# Patient Record
Sex: FEMALE | Race: WHITE | NOT HISPANIC OR LATINO | Employment: FULL TIME | URBAN - METROPOLITAN AREA
[De-identification: names, ages, dates, MRNs, and addresses within clinical notes are randomized per-mention and may not be internally consistent; named-entity substitution may affect disease eponyms.]

---

## 2019-02-01 ENCOUNTER — HOSPITAL ENCOUNTER (EMERGENCY)
Facility: CLINIC | Age: 29
Discharge: HOME | End: 2019-02-01
Admitting: PHYSICIAN ASSISTANT
Payer: COMMERCIAL

## 2019-02-01 VITALS
TEMPERATURE: 98.2 F | SYSTOLIC BLOOD PRESSURE: 122 MMHG | DIASTOLIC BLOOD PRESSURE: 83 MMHG | HEART RATE: 101 BPM | RESPIRATION RATE: 18 BRPM | OXYGEN SATURATION: 98 %

## 2019-02-01 DIAGNOSIS — K08.89 PAIN, DENTAL: ICD-10-CM

## 2019-02-01 PROCEDURE — 99282 EMERGENCY DEPT VISIT SF MDM: CPT

## 2019-02-01 RX ORDER — AMOXICILLIN 500 MG/1
500 CAPSULE ORAL 3 TIMES DAILY
Qty: 21 CAPSULE | Refills: 0 | Status: SHIPPED | OUTPATIENT
Start: 2019-02-01 | End: 2019-02-08

## 2019-02-01 ASSESSMENT — ENCOUNTER SYMPTOMS
FEVER: 0
SHORTNESS OF BREATH: 0
DIARRHEA: 0
VOMITING: 0
CHILLS: 0
NAUSEA: 0
TROUBLE SWALLOWING: 0
NUMBNESS: 0
WEAKNESS: 0

## 2019-02-01 NOTE — DISCHARGE INSTRUCTIONS
Based on the symptoms you presented with today, it is recommended that you undergo a CT scan of the face to evaluate for a deep space infection.  You have elected against this, with the understanding that we cannot rule out the possibility of a more serious infection of the jaw or face which could potentially be life threatening.  You may return to the emergency department at any time if you change your mind, or if you have any new or worsening symptoms.  Discharge Instructions  Dental Pain    You have been seen today for a toothache. Your pain may be caused by an exposed nerve, an infection (pulpitis), a root abscess (pocket of pus), or other problems. You will need to see a dentist for a solution to your tooth problem. Emergency Department care is only to help control your problem until you can see a dentist; we cannot provide complete dental care.  Today, we did not find any sign that your toothache was caused by any dangerous or life-threatening condition, but sometimes symptoms develop over time and cannot be found during an emergency visit, so it is very important that you follow up with your dentist.      Generally, every Emergency Department visit should have a follow-up clinic visit with either a primary or a specialty clinic/provider. Please follow-up as instructed by your emergency provider today.    Return to the Emergency Department if:  You develop a new fever over 100.4 F.  You cannot open your mouth normally, cannot move your tongue well, or cannot swallow.  You have new or increased swelling of your face or neck.  You develop drainage of pus or foul smelling material from around your tooth.  What can I do to help myself?  Take any antibiotic the provider may have prescribed for you today.  Avoid very hot or very cold foods as both can cause pain.  Make an appointment to see a dentist as soon as possible. Dentists are generally not ?on-staff? at hospitals so we cannot ?refer? to you to dentist but we  may be able to provide a list of dental clinics to help you.  If you were given a prescription for medicine here today, be sure to read all of the information (including the package insert) that comes with your prescription.  This will include important information about the medicine, its side effects, and any warnings that you need to know about.  The pharmacist who fills the prescription can provide more information and answer questions you may have about the medicine.  If you have questions or concerns that the pharmacist cannot address, please call or return to the Emergency Department.   Remember that you can always come back to the Emergency Department if you are not able to see your regular provider in the amount of time listed above, if you get any new symptoms, or if there is anything that worries you.

## 2019-02-01 NOTE — ED PROVIDER NOTES
History     Chief Complaint:  Dental Pain      HPI   Althea Christy is a 28 year old female who presents with dental pain. The patient states she has had previous issues with her bottom right wisdom tooth. She has been on antibiotics before, which has helped in the short term but issues have been recurrent 3-4 times over the last year. Three days ago she began having difficulty opening her mouth and when she tries to she states she experiences sharp shooting pain up her jaw. The patient notes that she has an appointment with a surgeon on February 7th for her wisdom tooth extraction. Her most recent episode of pain was one month ago, at which time she was placed on an antibiotic which she believes was Clindamycin which resolved her symptoms at that time. The patient denies fever, chills, shortness of breath, trouble swallowing, nausea, vomiting, diarrhea, numbness, or mouth sores. She has been using ibuprofen for pain relief at home, which she feels is sufficient.    Allergies:  NKDA    Medications:    The patient is currently on no regular medications.    Past Medical History:    History reviewed.  No significant past medical history.     Past Surgical History:    History reviewed. No pertinent past surgical history.    Family History:    History reviewed. No pertinent family history.    Social History:  Marital Status:  Single [1]  Negative for tobacco use.  Negative for alcohol use.     Review of Systems   Constitutional: Negative for chills and fever.   HENT: Positive for dental problem. Negative for mouth sores and trouble swallowing.    Respiratory: Negative for shortness of breath.    Gastrointestinal: Negative for diarrhea, nausea and vomiting.   Neurological: Negative for weakness and numbness.   All other systems reviewed and are negative.    Physical Exam     Patient Vitals for the past 24 hrs:   BP Temp Temp src Pulse Resp SpO2   02/01/19 1346 122/83 98.2  F (36.8  C) Oral 101 18 98 %         Physical  Exam  General: Alert and cooperative with exam. Resting comfortably on gurney  Head:  Scalp is NC/AT  Eyes:  No scleral icterus, PERRL,     EOMI   ENT:  The external nose and ears are normal.     There is some mild swelling of the right buccal mucosa.  There is no superficial area of fluctuance amenable to incision and drainage. Right lower 3rd molar is partially protruding through floor of mouth but has not fully erupted.  The patient has trismus on exam and is not able to fully open her mouth. Uvula midline, no submandibular swelling, sublingual swelling.  Neck:  Normal range of motion without rigidity.  CV:  Regular rate and rhythm    No pathologic murmur, rubs, or gallops.  Resp:  Breath sounds are clear bilaterally.  No crackles, wheezes, rhonchi.    Non-labored, no retractions or accessory muscle use  Skin:  Warm and dry, No rash or lesions noted.  Neuro: Oriented x 3. No gross motor deficits.    CN II-XII grossly intact.  Psych: Awake. Alert. Normal affect. Appropriate interactions.  Lymph: No anterior or posterior cervical lymphadenopathy noted.    Emergency Department Course   Emergency Department Course:  Nursing notes and vitals reviewed. (1401) I performed an exam of the patient as documented above.     (1420) I rechecked the patient and discussed the results of her workup thus far.     Findings and plan explained to the Patient. Patient discharged home with instructions regarding supportive care, medications, and reasons to return. The importance of close follow-up was reviewed.     I personally reviewed the laboratory results with the Patient and answered all related questions prior to discharge.      Impression & Plan    Medical Decision Makin-year-old female who presents with dental pain.  Patient history and records reviewed.  On examination, the patient is afebrile, mildly tachycardic but otherwise normal vitals.  Examination reveals trismus and some mild swelling of the right buccal mucosa  with tenderness to palpation over the right lower wisdom tooth.  There are no signs of Blake's angina, peritonsillar abscess, or abscess amenable to incision and drainage on exam.  Given the patient's obvious trismus on exam, I am concerned about the possibility of deep space infection and I recommended that she undergo CT scan to rule this out.  The patient declined, and states that she has had this symptom before in the past and her symptoms have improved following antibiotics.  She additionally states that she had x-rays 1 month ago when she had symptoms which did not show this.  I explained to the patient that this does not rule out the possibility that a new deep space infection could have formed, and that without further workup the possibility of serious and potentially life-threatening infection cannot be ruled out.  The patient understands this, but elects to leave AGAINST MEDICAL ADVICE and states that she will follow-up with her oral and maxillofacial surgeon as scheduled on February 7.  I did prescribe oral antibiotics as below.  She was additionally offered a dental block in the emergency department but declined.  She has no signs of more systemic toxicity at this time.  I stressed that the patient is free to return to the emergency department at any time if she changes her mind, or if she has any new or worsening symptoms.  Patient was discharged home in stable condition.    Diagnosis:    ICD-10-CM    1. Pain, dental K08.89        Disposition:  AGAINST MEDICAL ADVICE    Discharge Medications:     Medication List      Started    amoxicillin 500 MG capsule  Commonly known as:  AMOXIL  500 mg, Oral, 3 TIMES DAILY          Scribe Disclosure:  I, Ning King, am serving as a scribe on 2/1/2019 at 2:01 PM to personally document services performed by Geovanny Cash PA-C. providers found based on my observations and the provider's statements to me.         Ning King  2/1/2019   [facility]  \[facility] [place] [facility]\"" EMERGENCY DEPARTMENT       Geovanny Cash PA-C  02/01/19 2769

## 2019-02-01 NOTE — ED TRIAGE NOTES
Bottom right sided wisdom tooth infected. States that she has had previous infections and has a consult next week. Pain 7/10, swelling noted and hard to open mouth.

## 2019-02-01 NOTE — ED AVS SNAPSHOT
[facility] Emergency Department  [address]  [facility] [address]  Phone:  [phone]  Fax:  [phone]                                    Althea Christy   MRN: [id number]    Department:  [facility] Emergency Department   Date of Visit:  2/1/2019           After Visit Summary Signature Page    I have received my discharge instructions, and my questions have been answered. I have discussed any challenges I see with this plan with the nurse or doctor.    ..........................................................................................................................................  Patient/Patient Representative Signature      ..........................................................................................................................................  Patient Representative Print Name and Relationship to Patient    ..................................................               ................................................  Date                                   Time    ..........................................................................................................................................  Reviewed by Signature/Title    ...................................................              ..............................................  Date                                               Time          22EPIC Rev 08/18

## 2020-08-25 ASSESSMENT — ANXIETY QUESTIONNAIRES
5. BEING SO RESTLESS THAT IT IS HARD TO SIT STILL: NOT AT ALL
2. NOT BEING ABLE TO STOP OR CONTROL WORRYING: MORE THAN HALF THE DAYS
7. FEELING AFRAID AS IF SOMETHING AWFUL MIGHT HAPPEN: NOT AT ALL
1. FEELING NERVOUS, ANXIOUS, OR ON EDGE: MORE THAN HALF THE DAYS
GAD7 TOTAL SCORE: 8
IF YOU CHECKED OFF ANY PROBLEMS ON THIS QUESTIONNAIRE, HOW DIFFICULT HAVE THESE PROBLEMS MADE IT FOR YOU TO DO YOUR WORK, TAKE CARE OF THINGS AT HOME, OR GET ALONG WITH OTHER PEOPLE: VERY DIFFICULT
6. BECOMING EASILY ANNOYED OR IRRITABLE: MORE THAN HALF THE DAYS
3. WORRYING TOO MUCH ABOUT DIFFERENT THINGS: MORE THAN HALF THE DAYS

## 2020-08-25 ASSESSMENT — PATIENT HEALTH QUESTIONNAIRE - PHQ9
5. POOR APPETITE OR OVEREATING: NOT AT ALL
SUM OF ALL RESPONSES TO PHQ QUESTIONS 1-9: 9

## 2020-08-25 NOTE — PROGRESS NOTES
"Althea Christy is a 29 year old female who is being evaluated via a billable telephone visit.      The patient has been notified of following:     \"This telephone visit will be conducted via a call between you and your physician/provider. We have found that certain health care needs can be provided without the need for a physical exam.  This service lets us provide the care you need with a short phone conversation.  If a prescription is necessary we can send it directly to your pharmacy.  If lab work is needed we can place an order for that and you can then stop by our lab to have the test done at a later time.    Telephone visits are billed at different rates depending on your insurance coverage. During this emergency period, for some insurers they may be billed the same as an in-person visit.  Please reach out to your insurance provider with any questions.    If during the course of the call the physician/provider feels a telephone visit is not appropriate, you will not be charged for this service.\"    Patient has given verbal consent for Telephone visit?  Yes    What phone number would you like to be contacted at? [phone]    How would you like to obtain your AVS? Mail a copy    Subjective     Althea Christy is a 29 year old female who presents via phone visit today for the following health issues:    HPI      Depression     How are you doing with your depression.     Are you having other symptoms that might be associated with depression? Yes:  sad, lack of motivation, fatigue    Have you had a significant life event?  Relationship Concerns- sons father     Are you feeling anxious or having panic attacks?   Yes:  feels anxious alot     Do you have any concerns with your use of alcohol or other drugs? No- has history of alcohol abuse. Currently in outpatient treatment.  People think she might have Bipolar disorder- want to address this and depression.   Was on zoloft years ago and made her do nothging but lay on " couch hated it   Impulsive gambling addiction  Outpatient treatment [facility]   3 months program   Group 4 hrs Sat and Sun  Saw therapist prior to lat month, wants to avoid meds until has diagnosis   Eats well, sleeps as well as she can with small kids at home   3 children 2 and 3 year olds     Social History     Tobacco Use     Smoking status: Not on file   Substance Use Topics     Alcohol use: Not on file     Drug use: Not on file     No flowsheet data found.  No flowsheet data found.  Last PHQ-9 8/25/2020   1.  Little interest or pleasure in doing things 2   2.  Feeling down, depressed, or hopeless 2   3.  Trouble falling or staying asleep, or sleeping too much 0   4.  Feeling tired or having little energy 2   5.  Poor appetite or overeating 0   6.  Feeling bad about yourself 2   7.  Trouble concentrating 1   8.  Moving slowly or restless 0   Q9: Thoughts of better off dead/self-harm past 2 weeks 0   PHQ-9 Total Score 9   Difficulty at work, home, or with people Very difficult         Suicide Assessment Five-step Evaluation and Treatment (SAFE-T)      How many servings of fruits and vegetables do you eat daily?  2-3    On average, how many sweetened beverages do you drink each day (Examples: soda, juice, sweet tea, etc.  Do NOT count diet or artificially sweetened beverages)?   2    How many days per week do you exercise enough to make your heart beat faster? 3 or less    How many minutes a day do you exercise enough to make your heart beat faster? 9 or less    How many days per week do you miss taking your medication? 0         Review of Systems   Constitutional, HEENT, cardiovascular, pulmonary, GI, , musculoskeletal, neuro, skin, endocrine and psych systems are negative, except as otherwise noted.       Objective          Vitals:  No vitals were obtained today due to virtual visit.    healthy, alert and no distress  PSYCH: Alert and oriented times 3; coherent speech, normal   rate and  volume, able to articulate logical thoughts, able   to abstract reason, no tangential thoughts, no hallucinations   or delusions  Her affect is normal  RESP: No cough, no audible wheezing, able to talk in full sentences  Remainder of exam unable to be completed due to telephone visits            Assessment/Plan:    Assessment & Plan       ICD-10-CM    1. Impulsiveness  R45.87 MENTAL HEALTH REFERRAL  - Adult; Assessments and Testing; General Psychological Testing; [facility]: [facility] [phone]; We will contact you to schedule the appointment or please call with any questions   2. Depression, unspecified depression type  F32.9 MENTAL HEALTH REFERRAL  - Adult; Assessments and Testing; General Psychological Testing; [facility]: [facility] [phone]; We will contact you to schedule the appointment or please call with any questions   3. 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 She has never used smokeless tobacco.  Deferred to health maintenance exam         See Patient Instructions    Return in about 4 weeks (around 9/23/2020) for Physical Exam, Lab Work.    MAGALIE Hernandez CNP  [facility]    Phone call duration:  11 minutes

## 2020-08-26 ENCOUNTER — VIRTUAL VISIT (OUTPATIENT)
Dept: FAMILY MEDICINE | Facility: CLINIC | Age: 30
End: 2020-08-26
Payer: COMMERCIAL

## 2020-08-26 DIAGNOSIS — F43.22 ADJUSTMENT DISORDER WITH ANXIOUS MOOD: ICD-10-CM

## 2020-08-26 DIAGNOSIS — F63.0 PATHOLOGICAL GAMBLING: ICD-10-CM

## 2020-08-26 DIAGNOSIS — F32.A DEPRESSION, UNSPECIFIED DEPRESSION TYPE: ICD-10-CM

## 2020-08-26 DIAGNOSIS — R45.87 IMPULSIVENESS: Primary | ICD-10-CM

## 2020-08-26 PROCEDURE — 99203 OFFICE O/P NEW LOW 30 MIN: CPT | Mod: 95 | Performed by: NURSE PRACTITIONER

## 2020-08-26 PROCEDURE — 96127 BRIEF EMOTIONAL/BEHAV ASSMT: CPT | Mod: 95 | Performed by: NURSE PRACTITIONER

## 2020-08-26 ASSESSMENT — ANXIETY QUESTIONNAIRES: GAD7 TOTAL SCORE: 8

## 2020-08-26 NOTE — PATIENT INSTRUCTIONS
"  Patient Education   Forms of Depression  Depression can happen to anyone--man or woman, young or old. Sometimes it occurs for a reason, such as illness or grief. At other times, no cause can be found.  If you have depression, you cannot simply change your attitude or \"pull yourself out of it.\" You need treatment from a doctor, a therapist, or both.   The following is a list of the most common types of depression.  Reactive depression  Also called \"adjustment disorder with depressed mood.\" Symptoms of depression occur in response to major stress. For example, job loss, moving, the death of a loved one or a troubled relationship all can cause symptoms.  Dysthymia   Dysthymia is mild depression that lasts for at least two years (or at least one year in children). You may feel sad and tired almost every day. You might have low self-esteem. You may worry that you will never feel \"normal\" again.  Major depression   When symptoms last for at least two weeks and seem to have no cause, it is called severe depression. This is a serious illness that affects your mood, your thoughts and even your body. It changes the way you eat, sleep and interact with others. If symptoms are not treated, they can last for months or even years.  Postpartum depression  Postpartum depression may occur in women who have just had a baby. Symptoms last more than two weeks. They may be mild or severe.  Seasonal affective disorder (SAD)  SAD is a type of depression that occurs in the winter months, when there is less sunlight. Symptoms may begin in the late fall, peaking in the winter. When spring comes and the days grow longer, you start to feel better.   Bipolar disorder  Bipolar disorder causes severe mood swings that affect your thoughts, behavior and the way you function in life. This illness used to be called manic-depressive disorder. In the \"manic\" phase, you have lots of energy. You might talk a lot, sleep very little and act in reckless " "ways. In the \"depressed\" phase, you feel sad and low.   continued  Mood disorder caused by physical illness   Certain illnesses create changes in the body that can cause symptoms of depression. Examples include stroke, seizure disorders, Parkinson's disease and some cancers. This is not the same as depression that might occur when you are coping with medical problems.  Depression caused by alcohol and other drugs   Illegal drugs, alcohol and certain medicines can all cause symptoms of depression.   Where can I get more information?  Located within [facility]: [phone]   ([facility]) or [phone] ([facility]: [phone]   or [phone]  Depression and Bipolar Support [place]:   [phone], www.dbsalliance.org  National [place] of Mental Health:   193-280-0991, www.nimh.nih.gov   National [place] on Mental Illness:   724-734-GORM [5814], www.MARGIE.org  National Mental Health Association:   050-605-QJSG [8416], www.NMHA.org  For informational purposes only. Not to replace the advice of your health care provider.   Copyright   2006 [facility]. All rights reserved.   Clinically reviewed by Lizzette Ramesh. [employer] [id number] - REV 04/18.       "

## 2020-10-21 ENCOUNTER — FCC EXTENDED DOCUMENTATION (OUTPATIENT)
Dept: PSYCHOLOGY | Facility: CLINIC | Age: 30
End: 2020-10-21

## 2020-10-21 ENCOUNTER — VIRTUAL VISIT (OUTPATIENT)
Dept: PSYCHOLOGY | Facility: CLINIC | Age: 30
End: 2020-10-21
Attending: NURSE PRACTITIONER
Payer: COMMERCIAL

## 2020-10-21 DIAGNOSIS — F41.1 GENERALIZED ANXIETY DISORDER: Primary | ICD-10-CM

## 2020-10-21 DIAGNOSIS — F34.1 PERSISTENT DEPRESSIVE DISORDER: ICD-10-CM

## 2020-10-21 PROCEDURE — 99207 PR NO BILLABLE SERVICE THIS VISIT: CPT | Mod: 95 | Performed by: PSYCHOLOGIST

## 2020-10-21 ASSESSMENT — ANXIETY QUESTIONNAIRES
GAD7 TOTAL SCORE: 7
5. BEING SO RESTLESS THAT IT IS HARD TO SIT STILL: NOT AT ALL
IF YOU CHECKED OFF ANY PROBLEMS ON THIS QUESTIONNAIRE, HOW DIFFICULT HAVE THESE PROBLEMS MADE IT FOR YOU TO DO YOUR WORK, TAKE CARE OF THINGS AT HOME, OR GET ALONG WITH OTHER PEOPLE: VERY DIFFICULT
7. FEELING AFRAID AS IF SOMETHING AWFUL MIGHT HAPPEN: NOT AT ALL
1. FEELING NERVOUS, ANXIOUS, OR ON EDGE: SEVERAL DAYS
6. BECOMING EASILY ANNOYED OR IRRITABLE: NEARLY EVERY DAY
2. NOT BEING ABLE TO STOP OR CONTROL WORRYING: SEVERAL DAYS
3. WORRYING TOO MUCH ABOUT DIFFERENT THINGS: MORE THAN HALF THE DAYS

## 2020-10-21 ASSESSMENT — COLUMBIA-SUICIDE SEVERITY RATING SCALE - C-SSRS
1. IN THE PAST MONTH, HAVE YOU WISHED YOU WERE DEAD OR WISHED YOU COULD GO TO SLEEP AND NOT WAKE UP?: YES
1. IN THE PAST MONTH, HAVE YOU WISHED YOU WERE DEAD OR WISHED YOU COULD GO TO SLEEP AND NOT WAKE UP?: NO
ATTEMPT LIFETIME: NO
2. HAVE YOU ACTUALLY HAD ANY THOUGHTS OF KILLING YOURSELF LIFETIME?: NO

## 2020-10-21 ASSESSMENT — PATIENT HEALTH QUESTIONNAIRE - PHQ9
SUM OF ALL RESPONSES TO PHQ QUESTIONS 1-9: 8
5. POOR APPETITE OR OVEREATING: NOT AT ALL
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
## 2020-10-21 NOTE — Clinical Note
Thank you for the referral.    I saw the patient today for the first appointment in the Psychological Evaluation and she was diagnosed with anxiety and persistent depressive disorder. I will assess for PTSD, Bipolar, Panic Disorder, and Social Anxiety. I will then route the final diagnoses and recommendations to you when the evaluation is completed. If you have other diagnoses that you are wondering about, please let me know.     Please let me know if you have any questions.     Kiarra Mills PsyD LP

## 2020-10-22 ASSESSMENT — ANXIETY QUESTIONNAIRES: GAD7 TOTAL SCORE: 7

## 2020-10-28 ENCOUNTER — VIRTUAL VISIT (OUTPATIENT)
Dept: PSYCHOLOGY | Facility: CLINIC | Age: 30
End: 2020-10-28
Payer: COMMERCIAL

## 2020-10-28 DIAGNOSIS — F41.1 GENERALIZED ANXIETY DISORDER: Primary | ICD-10-CM

## 2020-10-28 DIAGNOSIS — F34.1 PERSISTENT DEPRESSIVE DISORDER: ICD-10-CM

## 2020-10-28 PROCEDURE — 90834 PSYTX W PT 45 MINUTES: CPT | Mod: 95 | Performed by: PSYCHOLOGIST
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
## 2020-11-09 ENCOUNTER — VIRTUAL VISIT (OUTPATIENT)
Dept: PSYCHOLOGY | Facility: CLINIC | Age: 30
End: 2020-11-09
Payer: COMMERCIAL

## 2020-11-09 DIAGNOSIS — F41.0 GENERALIZED ANXIETY DISORDER WITH PANIC ATTACKS: ICD-10-CM

## 2020-11-09 DIAGNOSIS — F33.0 MAJOR DEPRESSIVE DISORDER, RECURRENT EPISODE, MILD WITH ANXIOUS DISTRESS (H): Primary | ICD-10-CM

## 2020-11-09 DIAGNOSIS — F43.9 TRAUMA AND STRESSOR-RELATED DISORDER: ICD-10-CM

## 2020-11-09 DIAGNOSIS — F41.1 GENERALIZED ANXIETY DISORDER WITH PANIC ATTACKS: ICD-10-CM

## 2020-11-09 PROCEDURE — 90791 PSYCH DIAGNOSTIC EVALUATION: CPT | Mod: 95 | Performed by: PSYCHOLOGIST

## 2020-11-09 NOTE — Clinical Note
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    My full report and recommendations are available for your review.     I encouraged her to meet with you to discuss medication options. Please note that she reported that she found out yesterday that she is pregnant. I encouraged her to meet with you to confirm the pregnancy and to discuss her options. I also encouraged her to talk with you about medications that can be used during pregnancy.     Please let me know if you have any questions.   Kiarra Mills PsyD LP

## 2020-11-10 NOTE — PROGRESS NOTES
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     Recommendations:     1. Plan for Safety and Risk Management:   Recommended that patient call 911 or go to the local ED should there be a change in any of these risk factors..          Report to child / adult protection services was NA.     2. Patient's identified no concerns identified .     3. Initial Treatment will focus on:    referral to therpy and PCP to dicsuss medication options..     4. Resources/Service Plan:    services are not indicated.   Modifications to assist communication are not indicated.   Additional disability accommodations are not indicated.      5. Collaboration:   Collaboration / coordination of treatment will be initiated with the following  support professionals: N[facility] Treatment goal completed with the diagnostic assessment.      6.  Referrals:   The following referral(s) will be initiated: NA. Next Scheduled Appointment: NA.     A Release of Information has been obtained for the following: NA.    7. BEE:    BEE:  Discussed the impact of drugs and alcohol when used during pregnancy. Provider gave patient printed information about the effects of chemical use on their health and well being. Recommendations:  Encouraged  patient to maintain abstinence from all substances while she is pregnant.     8. Records:    were reviewed at time of assessment.   Information in this assessment was obtained from the medical record and  provided by patient who is a good historian.    Patient will have open access to their mental health medical record.      Eval type:  Mental Health     Recommendations:     1. Schedule an appointment with your physician to discuss a medication evaluation.  2. Individual therapy is recommended for the treatment of trauma, anxiety and depression. Therapies focusing on identifying and challenging problematic thought processes can be beneficial.  3. Continue with previous recommendations to complete a chemical dependency program.  4. Complete a Learning Disability Evaluation.   5. Schedule an appointment with PCP to confirm pregnancy and discuss options.  6. Refrain from substances while pregnant.     Referrals:  Learning Disability:  [facility]  ([phone])  [facility]:  [phone]  Diro Manzano, PhD  [phone]  [facility]: [phone]    Therapy:   [facility] ([phone]): TEMI Johnson and TEMI Gar  [phone])  [facility] [phone]        Provider Name/ Credentials:  Kiarra Mills PsyD LP   11/10/2020

## 2020-11-10 NOTE — PROGRESS NOTES
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 Plan for Safety and Risk Management:   Recommended that patient call 911 or go to the local ED should there be a change in any of these risk factors..          Report to child / adult protection services was NA.     2. Patient's identified no concerns identified .     3. Initial Treatment will focus on:    referral to therpy and PCP to dicsuss medication options..     4. Resources/Service Plan:    services are not indicated.   Modifications to assist communication are not indicated.   Additional disability accommodations are not indicated.      5. Collaboration:   Collaboration / coordination of treatment will be initiated with the following  support professionals: NGA Treatment goal completed with the diagnostic assessment.      6.  Referrals:   The following referral(s) will be initiated: NA. Next Scheduled Appointment: NA.     A Release of Information has been obtained for the following: NA.    7. BEE:    BEE:  Discussed the impact of drugs and alcohol when used during pregnancy. Provider gave patient printed information about the effects of chemical use on their health and well being. Recommendations:  Encouraged  patient to maintain abstinence from all substances while she is pregnant.     8. Records:    were reviewed at time of assessment.   Information in this assessment was obtained from the medical record and  provided by patient who is a good historian.    Patient will have open access to their mental health medical record.      Eval type:  Mental Health     Recommendations:     1. Schedule an appointment with your physician to discuss a medication evaluation.  2. Individual therapy is recommended for the treatment of trauma, anxiety and depression. Therapies focusing on identifying and challenging problematic thought processes can be beneficial.  3. Continue with previous recommendations to complete a chemical dependency program.  4. Complete a Learning Disability Evaluation.   5. Schedule an appointment with PCP to confirm pregnancy and discuss options.  6. Refrain from substances while pregnant.     Referrals:  Learning Disability:  [facility]  ([phone])  [facility]:  [phone]  Dior Manzano, PhD  [phone]  [facility]: [phone]    Therapy:   [facility] ([phone]): TEMI Johnson and TEMI Gar  ([phone])  [facility] [phone]        Provider Name/ Credentials:  Kiarra Mills PsyD LP   11/10/2020

## 2021-10-24 ENCOUNTER — HEALTH MAINTENANCE LETTER (OUTPATIENT)
Age: 31
End: 2021-10-24

## 2022-10-15 ENCOUNTER — HEALTH MAINTENANCE LETTER (OUTPATIENT)
Age: 32
End: 2022-10-15

## 2022-11-27 ENCOUNTER — HEALTH MAINTENANCE LETTER (OUTPATIENT)
Age: 32
End: 2022-11-27

## 2023-01-18 ENCOUNTER — OFFICE VISIT (OUTPATIENT)
Dept: MIDWIFE SERVICES | Facility: CLINIC | Age: 33
End: 2023-01-18
Payer: COMMERCIAL

## 2023-01-18 VITALS
DIASTOLIC BLOOD PRESSURE: 64 MMHG | WEIGHT: 121.9 LBS | SYSTOLIC BLOOD PRESSURE: 100 MMHG | BODY MASS INDEX: 23.93 KG/M2 | HEIGHT: 60 IN

## 2023-01-18 DIAGNOSIS — Z12.4 PAP SMEAR FOR CERVICAL CANCER SCREENING: ICD-10-CM

## 2023-01-18 DIAGNOSIS — N89.8 VAGINAL DISCHARGE: Primary | ICD-10-CM

## 2023-01-18 LAB
CLUE CELLS: ABNORMAL
TRICHOMONAS, WET PREP: ABNORMAL
WBC'S/HIGH POWER FIELD, WET PREP: ABNORMAL
YEAST, WET PREP: ABNORMAL

## 2023-01-18 PROCEDURE — G0145 SCR C/V CYTO,THINLAYER,RESCR: HCPCS | Performed by: ADVANCED PRACTICE MIDWIFE

## 2023-01-18 PROCEDURE — 87210 SMEAR WET MOUNT SALINE/INK: CPT | Performed by: ADVANCED PRACTICE MIDWIFE

## 2023-01-18 PROCEDURE — 87591 N.GONORRHOEAE DNA AMP PROB: CPT | Performed by: ADVANCED PRACTICE MIDWIFE

## 2023-01-18 PROCEDURE — 99203 OFFICE O/P NEW LOW 30 MIN: CPT | Performed by: ADVANCED PRACTICE MIDWIFE

## 2023-01-18 PROCEDURE — 87491 CHLMYD TRACH DNA AMP PROBE: CPT | Performed by: ADVANCED PRACTICE MIDWIFE

## 2023-01-18 PROCEDURE — 87624 HPV HI-RISK TYP POOLED RSLT: CPT | Performed by: ADVANCED PRACTICE MIDWIFE

## 2023-01-18 NOTE — PROGRESS NOTES
"  SUBJECTIVE:                                                   Althea Christy is a 32 year old who presents to clinic today for the following health issue(s):  Patient presents with:  Women's Health: Last pap was on 2020: NIL/HPV negative  Pelvic Pain: Lower left abdominal pain on and off for about 3 months. Reports hx of ruptured ovarian cyst. Reports vaginal itching and a bump.      HPI:  Althea presents for some left lower abdominal discomfort. The pain is intermittent and sharp. She has not noted any pattern to the pain. She is not feeling the pain today. Her other concern is she has noted some vaginal itching with discharge, and she noted a \"bump\" on her labia last evening.    Patient's last menstrual period was 2022 (exact date).  Menstrual History: frequency: every 28-30 days  Patient is sexually active  .  Using tubal ligation for contraception.   Health maintenance updated:  yes  STI infx testing offered:  Accepted    Last PHQ-9 score on record =   PHQ-9 SCORE 10/21/2020   PHQ-9 Total Score 8     Last GAD7 score on record =   ASHLIE-7 SCORE 10/21/2020   Total Score 7         Problem list and histories reviewed & adjusted, as indicated.  Additional history: as documented.    Patient Active Problem List   Diagnosis     Impulsiveness     Postpartum depression     Adjustment disorder with anxious mood     Pathological gambling     Anxiety     Tobacco abuse     History reviewed. No pertinent surgical history.   Social History     Tobacco Use     Smoking status: Every Day     Types: Cigarettes     Smokeless tobacco: Never   Substance Use Topics     Alcohol use: Yes           No current outpatient medications on file.     No current facility-administered medications for this visit.     No Known Allergies    ROS:  CONSTITUTIONAL: NEGATIVE for fever, chills, change in weight  GI: NEGATIVE for nausea, abdominal pain, heartburn, or change in bowel habits  : NEGATIVE for unusual urinary or vaginal " symptoms. Periods are regular.  POSITIVE for left pelvic pain. Vaginal bump. Vaginal itching.  NEURO: NEGATIVE for weakness, dizziness or paresthesias  HEME/ALLERGY/IMMUNE: NEGATIVE for bleeding problems  PSYCHIATRIC: NEGATIVE for changes in mood or affect    OBJECTIVE:     /64 (BP Location: Left arm, Cuff Size: Adult Regular)   Ht 1.524 m (5')   Wt 55.3 kg (121 lb 14.4 oz)   LMP 12/21/2022 (Exact Date)   Breastfeeding No   BMI 23.81 kg/m    Body mass index is 23.81 kg/m .    PHYSICAL EXAM:  Constitutional:  Appearance: Well nourished, well developed alert, in no acute distress  Chest:  Respiratory Effort:  Breathing unlabored.   Neurologic:  Mental Status:  Oriented X3.  Normal strength and tone, sensory exam grossly normal, mentation intact and speech normal.    Psychiatric:  Mentation appears normal and affect normal/bright.     Pelvic Exam:  Vulva: No external lesions, normal hair distribution, no adenopathy, plugged hair follicle noted on right labia  Vagina: Moist, pink, no abnormal discharge, well rugated, no lesions, swab collected for wet prep and GC/Chlam  Cervix: Pap smear is taken, parous, smooth, pink, no visible lesions  Uterus: Normal size, anteverted, non-tender, mobile  Ovaries: No mass, non-tender, mobile  Rectal exam: Deferred    In-Clinic Test Results:  No results found for this or any previous visit (from the past 24 hour(s)).    ASSESSMENT/PLAN:                                                        ICD-10-CM    1. Vaginal discharge  N89.8 Wet prep - Clinic Collect     NEISSERIA GONORRHOEA PCR     CHLAMYDIA TRACHOMATIS PCR      2. Pap smear for cervical cancer screening  Z12.4 Pap screen with HPV - recommended age 30 - 65 years          PLAN:    Labs pending. Will advise patient of lab results when available. Encouraged patient to call with any questions or concerns.  Reassured patient that lump that she was noting is a plugged hair follicle.     MAGALIE Cabrera, CNM

## 2023-01-20 LAB
C TRACH DNA SPEC QL NAA+PROBE: NEGATIVE
N GONORRHOEA DNA SPEC QL NAA+PROBE: NEGATIVE

## 2023-01-23 LAB
BKR LAB AP GYN ADEQUACY: NORMAL
BKR LAB AP GYN INTERPRETATION: NORMAL
BKR LAB AP HPV REFLEX: NORMAL
BKR LAB AP LMP: NORMAL
BKR LAB AP PREVIOUS ABNORMAL: NORMAL
PATH REPORT.COMMENTS IMP SPEC: NORMAL
PATH REPORT.COMMENTS IMP SPEC: NORMAL
PATH REPORT.RELEVANT HX SPEC: NORMAL

## 2023-01-24 ENCOUNTER — TELEPHONE (OUTPATIENT)
Dept: FAMILY MEDICINE | Facility: CLINIC | Age: 33
End: 2023-01-24

## 2023-01-24 ENCOUNTER — VIRTUAL VISIT (OUTPATIENT)
Dept: FAMILY MEDICINE | Facility: CLINIC | Age: 33
End: 2023-01-24
Payer: COMMERCIAL

## 2023-01-24 DIAGNOSIS — F32.4 MAJOR DEPRESSIVE DISORDER IN PARTIAL REMISSION, UNSPECIFIED WHETHER RECURRENT (H): ICD-10-CM

## 2023-01-24 DIAGNOSIS — Z72.0 TOBACCO ABUSE: ICD-10-CM

## 2023-01-24 DIAGNOSIS — R05.1 ACUTE COUGH: Primary | ICD-10-CM

## 2023-01-24 DIAGNOSIS — Z71.89 ADVANCED DIRECTIVES, COUNSELING/DISCUSSION: ICD-10-CM

## 2023-01-24 PROCEDURE — 99204 OFFICE O/P NEW MOD 45 MIN: CPT | Mod: 95 | Performed by: NURSE PRACTITIONER

## 2023-01-24 ASSESSMENT — PATIENT HEALTH QUESTIONNAIRE - PHQ9
10. IF YOU CHECKED OFF ANY PROBLEMS, HOW DIFFICULT HAVE THESE PROBLEMS MADE IT FOR YOU TO DO YOUR WORK, TAKE CARE OF THINGS AT HOME, OR GET ALONG WITH OTHER PEOPLE: NOT DIFFICULT AT ALL
SUM OF ALL RESPONSES TO PHQ QUESTIONS 1-9: 0
SUM OF ALL RESPONSES TO PHQ QUESTIONS 1-9: 0

## 2023-01-24 ASSESSMENT — ENCOUNTER SYMPTOMS: SORE THROAT: 1

## 2023-01-24 NOTE — TELEPHONE ENCOUNTER
Per virtual visit check out  Visit Disposition    Dispositions Check-out Note   0 Return in about 3 days (around 1/27/2023), or if symptoms worsen or fail to improve. Mail advanced directive         Christelle Ch CMA ([facility])

## 2023-01-24 NOTE — PROGRESS NOTES
Althea is a 32 year old who is being evaluated via a billable video visit.      How would you like to obtain your AVS? MyChart  If the video visit is dropped, the invitation should be resent by: Text to cell phone: [phone]  Will anyone else be joining your video visit? No        Assessment & Plan     Tobacco abuse  Advised cessation today.     Advanced directives, counseling/discussion  Single, mailing info.     Acute cough  Bi-modal illness with tobacco use.   Covering for strep and poss. Pneumonia  Home cares.   F/u if fever not gone in 3 days.   warning signs discussed.     - amoxicillin-clavulanate (AUGMENTIN) 875-125 MG tablet; Take 1 tablet by mouth 2 times daily for 7 days    Major depressive disorder in partial remission, unspecified whether recurrent (H)  Partial remission, continue counseling.   Updated DAP.     Advised PE>                Nicotine/Tobacco Cessation:  She reports that she has been smoking cigarettes. She has never used smokeless tobacco.  Nicotine/Tobacco Cessation Plan:   Information offered: Patient not interested at this time          Return in about 3 days (around 1/27/2023), or if symptoms worsen or fail to improve.   Follow-up Visit   Expected date:  Feb 24, 2023 (Approximate)      Follow Up Appointment Details:     Follow-up with whom?: PCP    Follow-Up for what?: Adult Preventive    Any Additional Chronic Condition Management?: Depression    How?: In Person    Is this an as-needed follow-up?: No                    MAGALIE Cohen CNP  [facility] KATHERINE Oh is a 32 year old, presenting for the following health issues:  Pharyngitis (Post covid- Tested positive 01/14/2023. Symptom free for 4 days, Tested negative 01/20/2023. Woke up Monday Morning 01/23/2023 with Sore throat, fever, chills, body aches. Complains that the sore throat is the worst symptom.)      Pharyngitis     History of Present Illness       Reason for visit:  New symptoms  Symptom  onset:  1-3 days ago  Symptoms include:  Chills, bodyaches, and sore throat  Symptom intensity:  Severe  Symptom progression:  Worsening  Had these symptoms before:  No  What makes it worse:  No  What makes it better:  Advil-Ibuprofen helps with fever, chills, body aches but not the throat    She eats 2-3 servings of fruits and vegetables daily.She consumes 0 sweetened beverage(s) daily.She exercises with enough effort to increase her heart rate 9 or less minutes per day.  She exercises with enough effort to increase her heart rate 3 or less days per week.   She is taking medications regularly.    Today's PHQ-9         PHQ-9 Total Score: 0    PHQ-9 Q9 Thoughts of better off dead/self-harm past 2 weeks :   Not at all    How difficult have these problems made it for you to do your work, take care of things at home, or get along with other people: Not difficult at all       Acute Illness  Acute illness concerns: Sore throat  Onset/Duration: 3 days after negative covid test. 01/23/23  Symptoms: Fever, chills, body aches, sore thraot  Fever: YES  Chills/Sweats: YES  Headache (location?): No  Sinus Pressure: No  Conjunctivitis:  No  Ear Pain: no  Rhinorrhea: No  Congestion: YES  Sore Throat: YES  Cough: no  Wheeze: No  Decreased Appetite: YES- A little, not to bad tho  Nausea: No  Vomiting: No  Diarrhea: No  Dysuria/Freq.: No  Dysuria or Hematuria: No  Fatigue/Achiness: YES- Achiness  Sick/Strep Exposure: Unknown. Pt kids went to school while she quarantined. After testing negative, she spent the weekend with kiddos that may have brought something home from day care.  Therapies tried and outcome: ibuprofen, advil rotating.        Has 4 kids. No recent illness contacts.     Had quarantined with Covid.     + smoker    Mood- stable. In therapy. Not on medications.     ;single.   Has 4 children.     Past, social,  Family history reviewed.     Review of Systems   HENT: Positive for sore throat.       Constitutional, HEENT,  cardiovascular, pulmonary, gi and gu systems are negative, except as otherwise noted.      Objective           Vitals:  No vitals were obtained today due to virtual visit.    Physical Exam   GENERAL: Healthy, alert and no distress  EYES: Eyes grossly normal to inspection.  No discharge or erythema, or obvious scleral/conjunctival abnormalities.  RESP: No audible wheeze, cough, or visible cyanosis.  No visible retractions or increased work of breathing.    SKIN: Visible skin clear. No significant rash, abnormal pigmentation or lesions.  NEURO: Cranial nerves grossly intact.  Mentation and speech appropriate for age.  PSYCH: Mentation appears normal, affect normal/bright, judgement and insight intact, normal speech and appearance well-groomed.                Video-Visit Details    Type of service:  Video Visit   Video Start Time: 1002  Video End Time:1010    Originating Location (pt. Location): Home  Distant Location (provider location):  Off-site  Platform used for Video Visit: BBC Easy

## 2023-01-25 LAB
HUMAN PAPILLOMA VIRUS 16 DNA: NEGATIVE
HUMAN PAPILLOMA VIRUS 18 DNA: NEGATIVE
HUMAN PAPILLOMA VIRUS FINAL DIAGNOSIS: NORMAL
HUMAN PAPILLOMA VIRUS OTHER HR: NEGATIVE

## 2023-03-14 ENCOUNTER — LAB (OUTPATIENT)
Dept: URGENT CARE | Facility: URGENT CARE | Age: 33
End: 2023-03-14
Attending: NURSE PRACTITIONER
Payer: COMMERCIAL

## 2023-03-14 ENCOUNTER — VIRTUAL VISIT (OUTPATIENT)
Dept: FAMILY MEDICINE | Facility: CLINIC | Age: 33
End: 2023-03-14
Payer: COMMERCIAL

## 2023-03-14 DIAGNOSIS — J06.9 VIRAL UPPER RESPIRATORY TRACT INFECTION: Primary | ICD-10-CM

## 2023-03-14 LAB
DEPRECATED S PYO AG THROAT QL EIA: NEGATIVE
FLUAV AG SPEC QL IA: NEGATIVE
FLUBV AG SPEC QL IA: NEGATIVE
GROUP A STREP BY PCR: NOT DETECTED

## 2023-03-14 PROCEDURE — 87804 INFLUENZA ASSAY W/OPTIC: CPT | Mod: VID | Performed by: NURSE PRACTITIONER

## 2023-03-14 PROCEDURE — 87651 STREP A DNA AMP PROBE: CPT | Mod: VID | Performed by: NURSE PRACTITIONER

## 2023-03-14 PROCEDURE — 99213 OFFICE O/P EST LOW 20 MIN: CPT | Mod: VID | Performed by: NURSE PRACTITIONER

## 2023-03-14 RX ORDER — GUAIFENESIN, PSEUDOEPHEDRINE HYDROCHLORIDE 600; 60 MG/1; MG/1
1 TABLET, EXTENDED RELEASE ORAL EVERY 12 HOURS
Qty: 14 TABLET | Refills: 0 | Status: SHIPPED | OUTPATIENT
Start: 2023-03-14

## 2023-03-14 NOTE — PROGRESS NOTES
Althea is a 32 year old who is being evaluated via a billable video visit.      How would you like to obtain your AVS? MyChart  If the video visit is dropped, the invitation should be resent by: Text to cell phone: [phone]  Will anyone else be joining your video visit? No          Assessment & Plan     Viral upper respiratory tract infection  Discussed this is likely viral upper respiratory infection.  We will test for COVID flu and strep given her recent exposure.  Recommended symptom management with Tylenol ibuprofen and I will send in Mucinex and Sudafed.  Could also consider Flonase nasal spray or saline lavage.  Recommended steam treatments and humidification.  If symptoms persist for 7 to 10 days, could consider antibiotic treatment at that time.   - pseudoePHEDrine-guaiFENesin (MUCINEX D)  MG 12 hr tablet; Take 1 tablet by mouth every 12 hours  - Influenza A & B Antigen - Clinic Collect  - Symptomatic COVID-19 Virus (Coronavirus) by PCR; Future  - Streptococcus A Rapid Screen w/Reflex to PCR - Clinic Collect      Return for Follow up if symptoms worsen or fail to improve.    MAGALIE [facility]   Althea is a 32 year old, presenting for the following health issues:  Sinus Problem (Sinus issues since yesterday )      Sinus Problem     History of Present Illness       Reason for visit:  Sinus infection  Symptom onset:  1-3 days ago  Symptoms include:  Pressure in my face, nose feels sneezy, swollen lymph nodes  Symptom intensity:  Moderate  Symptom progression:  Staying the same  Had these symptoms before:  No  What makes it worse:  No  What makes it better:  Medicine    She eats 4 or more servings of fruits and vegetables daily.She consumes 1 sweetened beverage(s) daily.She exercises with enough effort to increase her heart rate 10 to 19 minutes per day.  She exercises with enough effort to increase her heart rate 3 or less days per week.   She  is taking medications regularly.       Symptoms started 2 days ago, symptoms are in face, nose, under eyes.  Throat is mildly sore, but mostly swollen lymph nodes.  Mild congestion  Daughter had strep last week, she works in a  center   Just had strep, walking pneumonia, and COVID 2 month agos.  Was treated with amoxicillin.        Review of Systems   Constitutional, HEENT, cardiovascular, pulmonary, gi and gu systems are negative, except as otherwise noted.      Objective           Vitals:  No vitals were obtained today due to virtual visit.    Physical Exam   GENERAL: Healthy, alert and no distress  EYES: Eyes grossly normal to inspection.  No discharge or erythema, or obvious scleral/conjunctival abnormalities.  RESP: No audible wheeze, cough, or visible cyanosis.  No visible retractions or increased work of breathing.    SKIN: Visible skin clear. No significant rash, abnormal pigmentation or lesions.  NEURO: Cranial nerves grossly intact.  Mentation and speech appropriate for age.  PSYCH: Mentation appears normal, affect normal/bright, judgement and insight intact, normal speech and appearance well-groomed.                Video-Visit Details    Type of service:  Video Visit   Video Start Time: 1034  Video End Time:1043    Originating Location (pt. Location): Home    Distant Location (provider location):  Off-site  Platform used for Video Visit: Vinja

## 2023-10-11 ENCOUNTER — VIRTUAL VISIT (OUTPATIENT)
Dept: MIDWIFE SERVICES | Facility: CLINIC | Age: 33
End: 2023-10-11
Payer: COMMERCIAL

## 2023-10-11 DIAGNOSIS — N83.201 CYSTS OF BOTH OVARIES: Primary | ICD-10-CM

## 2023-10-11 DIAGNOSIS — N83.202 CYSTS OF BOTH OVARIES: Primary | ICD-10-CM

## 2023-10-11 PROCEDURE — 99213 OFFICE O/P EST LOW 20 MIN: CPT | Mod: 95 | Performed by: ADVANCED PRACTICE MIDWIFE

## 2023-10-11 NOTE — PROGRESS NOTES
Virtual Visit Details    Type of service:  Video Visit   Video Start Time:  1008  Video End Time: 1015    Originating Location (pt. Location): Other Work Place Private Office.      Distant Location (provider location):  On-site   [facility]  Platform used for Video Visit: Amado      CC/HPI:  33 year old  presents for discussion of recurrent and frequent ovarian cyst with ED visits for management.    Menstrual history:  Cycle length: 28  Cycle duration: 3-5  Symptoms: Normal  STI: No    Review Of Systems  Skin: negative  Respiratory: No shortness of breath, dyspnea on exertion, cough, or hemoptysis  Cardiovascular: negative  Gastrointestinal: negative  Musculoskeletal: negative  Neurologic: negative  Psychiatric: negative  Hematologic/Lymphatic/Immunologic: negative  Endocrine: negative    Past Medical History:   Diagnosis Date    Anemia due to blood loss, acute 2014    ASHLIE (generalized anxiety disorder)     Gambling problem     Post partum depression     Tobacco abuse        No past surgical history on file.    Family History   Problem Relation Age of Onset    Hypertension Mother     Coronary Artery Disease Maternal Grandfather     Breast Cancer No family hx of     Colon Cancer No family hx of     Ovarian Cancer No family hx of        Social History     Socioeconomic History    Marital status: Single     Spouse name: Not on file    Number of children: Not on file    Years of education: Not on file    Highest education level: Not on file   Occupational History    Not on file   Tobacco Use    Smoking status: Every Day     Types: Cigarettes    Smokeless tobacco: Never   Vaping Use    Vaping Use: Never used   Substance and Sexual Activity    Alcohol use: Yes    Drug use: Never    Sexual activity: Yes     Partners: Male     Birth control/protection: Female Surgical   Other Topics Concern    Not on file   Social History Narrative    Not on file     Social Determinants of Health     Financial Resource  Strain: Not on file   Food Insecurity: Not on file   Transportation Needs: Not on file   Physical Activity: Not on file   Stress: Not on file   Social Connections: Not on file   Interpersonal Safety: Not on file   Housing Stability: Not on file         Current Outpatient Medications:     pseudoePHEDrine-guaiFENesin (MUCINEX D)  MG 12 hr tablet, Take 1 tablet by mouth every 12 hours, Disp: 14 tablet, Rfl: 0    No Known Allergies    Exam:   Video visit  There were no vitals filed for this visit.  There is no height or weight on file to calculate BMI.     Exam:  Constitutional: healthy, alert, and no distress  Head: Normocephalic. No masses, lesions, tenderness or abnormalities  Respiratory: negative  No SOB noted in video      A:  33 year old  with tubal ligation for birth control.    Smoker  Ovarian cysts with periodic rupture.    PLAN: Reviewed that suppression of ovulation with contraception methods that work with that can decrease formation of cysts.    Estrogen is contraindicated in this pt due to smoking currently and hx of.  Depo Provera reviewed with usual side effects of some DUB with spotting but that amenorrhea in 60% after 2nd injection.      Wt gain of 2-4# a year can be common so monitoring of that is encouraged.      Patient can make a RN only appt to start Depo PRN with UPT prior.      prep time day of service 6 min  visit time with the patient 7 min  post visit work including documentation time 7 min  Total time: 20 min     Albert MEJIAS, PONCHO

## 2023-10-13 ENCOUNTER — TELEPHONE (OUTPATIENT)
Dept: FAMILY MEDICINE | Facility: CLINIC | Age: 33
End: 2023-10-13

## 2023-10-13 NOTE — TELEPHONE ENCOUNTER
Patient called back, stated that she did see a provider on Friday 10/6. She is going to figure it out and make another appointment. Canceled appointment for today 10/13/2023

## 2023-10-13 NOTE — TELEPHONE ENCOUNTER
"Left message on voicemail for patient to call back.     Pt is scheduled for a depo injection this morning with MA.  No orders and no recent visit with documentation.  Pt will need an office visit with provider.      Office visit notes from 1/28/23 state \"Using tubal ligation for contraception\"  "

## 2023-12-28 ENCOUNTER — MYC MEDICAL ADVICE (OUTPATIENT)
Dept: INTERNAL MEDICINE | Facility: CLINIC | Age: 33
End: 2023-12-28

## 2023-12-28 ENCOUNTER — VIRTUAL VISIT (OUTPATIENT)
Dept: INTERNAL MEDICINE | Facility: CLINIC | Age: 33
End: 2023-12-28
Payer: COMMERCIAL

## 2023-12-28 DIAGNOSIS — J02.0 STREP THROAT: ICD-10-CM

## 2023-12-28 DIAGNOSIS — Z20.818 STREPTOCOCCUS EXPOSURE: Primary | ICD-10-CM

## 2023-12-28 PROCEDURE — 99213 OFFICE O/P EST LOW 20 MIN: CPT | Mod: VID | Performed by: NURSE PRACTITIONER

## 2023-12-28 RX ORDER — PENICILLIN V POTASSIUM 500 MG/1
500 TABLET, FILM COATED ORAL 2 TIMES DAILY
Qty: 20 TABLET | Refills: 0 | Status: SHIPPED | OUTPATIENT
Start: 2023-12-28

## 2023-12-28 ASSESSMENT — ENCOUNTER SYMPTOMS: SORE THROAT: 1

## 2023-12-28 NOTE — PROGRESS NOTES
Althea is a 33 year old who is being evaluated via a billable video visit.      How would you like to obtain your AVS? MyChart  If the video visit is dropped, the invitation should be resent by: Text to cell phone: [phone]  Will anyone else be joining your video visit? No          Assessment & Plan     Streptococcus exposure    - REVIEW OF HEALTH MAINTENANCE PROTOCOL ORDERS  - penicillin V (VEETID) 500 MG tablet; Take 1 tablet (500 mg) by mouth 2 times daily    Strep throat    - REVIEW OF HEALTH MAINTENANCE PROTOCOL ORDERS  - penicillin V (VEETID) 500 MG tablet; Take 1 tablet (500 mg) by mouth 2 times daily      7 minutes spent by me on the date of the encounter doing chart review, history and exam, documentation and further activities per the note       Nicotine/Tobacco Cessation:  She reports that she has been smoking cigarettes. She has never used smokeless tobacco.  Nicotine/Tobacco Cessation Plan:         Patient Instructions   Penicillin twice daily for 10 days     If not improving you would need to be seen     MAGALIE Dean CNP  [facility]   Althea is a 33 year old, presenting for the following health issues:  Pharyngitis      12/28/2023    11:28 AM   Additional Questions   Roomed by mac cole cma       Pharyngitis     History of Present Illness       Reason for visit:  Strep  Symptom onset:  1-3 days ago  Symptoms include:  Sore throat and white spots in my throat  Symptom intensity:  Moderate  Symptom progression:  Worsening  Had these symptoms before:  No  What makes it worse:  No  What makes it better:  No    She eats 2-3 servings of fruits and vegetables daily.She consumes 0 sweetened beverage(s) daily.She exercises with enough effort to increase her heart rate 30 to 60 minutes per day.  She exercises with enough effort to increase her heart rate 5 days per week.   She is taking medications regularly.     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       Constitutional, HEENT, cardiovascular, pulmonary, GI, , musculoskeletal, neuro, skin, endocrine and psych systems are negative, except as otherwise noted.      Objective           Vitals:  No vitals were obtained today due to virtual visit.    Physical Exam   GENERAL: Healthy, alert and no distress  EYES: Eyes grossly normal to inspection.  No discharge or erythema, or obvious scleral/conjunctival abnormalities.  RESP: No audible wheeze, cough, or visible cyanosis.  No visible retractions or increased work of breathing.    SKIN: Visible skin clear. No significant rash, abnormal pigmentation or lesions.  NEURO: Cranial nerves grossly intact.  Mentation and speech appropriate for age.  PSYCH: Mentation appears normal, affect normal/bright, judgement and insight intact, normal speech and appearance well-groomed.                Video-Visit Details    Type of service:  Video Visit   Video Start Time: 11:41 AM  Video End Time:11:48 AM    Originating Location (pt. Location): Home    Distant Location (provider location):  On-site  Platform used for Video Visit: Bradly

## 2024-01-07 ENCOUNTER — HEALTH MAINTENANCE LETTER (OUTPATIENT)
Age: 34
End: 2024-01-07

## 2024-05-01 ENCOUNTER — PATIENT OUTREACH (OUTPATIENT)
Dept: CARE COORDINATION | Facility: CLINIC | Age: 34
End: 2024-05-01
Payer: COMMERCIAL

## 2024-05-01 NOTE — PROGRESS NOTES
Clinic Care Coordination Contact  Program:   [facility]: [place]   Renewal: [employer]  Date Applied:      JACKIE Outreach:   5/1/24: 1st outreach attempt. Left a message on voicemail with call back information and requested return call.  Plan: CTA will call again within 2 weeks.  Татьяна Avila  Care   [facility]  Clinic Care Coordination  [phone]      Health Insurance:        Referral/Screening:

## 2024-05-02 ENCOUNTER — OFFICE VISIT (OUTPATIENT)
Dept: INTERNAL MEDICINE | Facility: CLINIC | Age: 34
End: 2024-05-02
Payer: COMMERCIAL

## 2024-05-02 VITALS
RESPIRATION RATE: 16 BRPM | SYSTOLIC BLOOD PRESSURE: 104 MMHG | HEIGHT: 60 IN | DIASTOLIC BLOOD PRESSURE: 64 MMHG | BODY MASS INDEX: 23.34 KG/M2 | WEIGHT: 118.9 LBS | HEART RATE: 102 BPM | OXYGEN SATURATION: 97 % | TEMPERATURE: 98.2 F

## 2024-05-02 DIAGNOSIS — H92.01 RIGHT EAR PAIN: Primary | ICD-10-CM

## 2024-05-02 PROCEDURE — 99213 OFFICE O/P EST LOW 20 MIN: CPT | Performed by: INTERNAL MEDICINE

## 2024-05-02 NOTE — PROGRESS NOTES
Assessment & Plan     Right ear pain  From examination,  Wax buildup noted in the right ear canal.  Discussed with the patient on possibility of symptoms secondary to wax buildup.  No recent fevers or chills or upper respiratory infection-like symptoms.  Can use Debrox drops which can be used to help with wax removal.  To follow-up sooner if symptoms do not improve or if patient develops symptoms suggestive of infection including fevers/ chills or hearing changes.  - carbamide peroxide (DEBROX) 6.5 % otic solution; Place 5 drops into the right ear 2 times daily                  Subjective   Althea is a 33 year old, presenting for the following health issues:  Ear Problem (Right ear pain started yesterday )        5/2/2024    12:38 PM   Additional Questions   Roomed by Anthonyi   Accompanied by Self     History of Present Illness       Reason for visit:  Ear pain  Symptom onset:  1-3 days ago  Symptoms include:  Ear pain  Symptom intensity:  Moderate  Symptom progression:  Worsening  Had these symptoms before:  No  What makes it worse:  No  What makes it better:  No    She eats 2-3 servings of fruits and vegetables daily.She consumes 1 sweetened beverage(s) daily.She exercises with enough effort to increase her heart rate 10 to 19 minutes per day.  She exercises with enough effort to increase her heart rate 3 or less days per week.   She is taking medications regularly.                 Review of Systems  Constitutional, HEENT, cardiovascular, pulmonary, gi and gu systems are negative, except as otherwise noted.      Objective    /64 (BP Location: Right arm, Patient Position: Sitting, Cuff Size: Adult Regular)   Pulse 102   Temp 98.2  F (36.8  C) (Tympanic)   Resp 16   Ht 1.524 m (5')   Wt 53.9 kg (118 lb 14.4 oz)   SpO2 97%   BMI 23.22 kg/m    Body mass index is 23.22 kg/m .  Physical Exam   HENT: ear canals and TM's normal, nose without lesions  MS: no gross musculoskeletal defects noted, no edema  NEURO:  Normal strength and tone, mentation intact and speech normal  PSYCH: mentation appears normal, affect normal            Signed Electronically by: Argelia Hemphill MD

## 2024-05-06 ENCOUNTER — PATIENT OUTREACH (OUTPATIENT)
Dept: CARE COORDINATION | Facility: CLINIC | Age: 34
End: 2024-05-06
Payer: COMMERCIAL

## 2024-05-06 NOTE — PROGRESS NOTES
Clinic Care Coordination Contact  Program:   [facility]: [place]   Renewal: [employer]  Date Applied:      FR Outreach:   5/6/24: CTA called to see if patient needed assistance with their [employer] Renewal. Patient declined needing assistance and no follow up needed   CTA WILL MAIL APPLICATION TO PT. [facility] UPDATED ADDRESS FOR PT.   Татьяна Aj   [facility]  Clinic Care Coordination  [phone]    5/1/24: 1st outreach attempt. Left a message on [employer] with call back information and requested return call.  Plan: CTA will call again within 2 weeks.  Татьяна Aj   [facility]  Clinic Care Coordination  [phone]      Health Insurance:        Referral/Screening:

## 2025-01-25 ENCOUNTER — HEALTH MAINTENANCE LETTER (OUTPATIENT)
Age: 35
End: 2025-01-25